# Patient Record
Sex: FEMALE | NOT HISPANIC OR LATINO
[De-identification: names, ages, dates, MRNs, and addresses within clinical notes are randomized per-mention and may not be internally consistent; named-entity substitution may affect disease eponyms.]

---

## 2023-11-30 ENCOUNTER — APPOINTMENT (OUTPATIENT)
Dept: ORTHOPEDIC SURGERY | Facility: CLINIC | Age: 68
End: 2023-11-30
Payer: MEDICARE

## 2023-11-30 VITALS — HEIGHT: 62 IN | BODY MASS INDEX: 29.63 KG/M2 | WEIGHT: 161 LBS

## 2023-11-30 DIAGNOSIS — M51.36 OTHER INTERVERTEBRAL DISC DEGENERATION, LUMBAR REGION: ICD-10-CM

## 2023-11-30 DIAGNOSIS — E78.00 PURE HYPERCHOLESTEROLEMIA, UNSPECIFIED: ICD-10-CM

## 2023-11-30 PROBLEM — Z00.00 ENCOUNTER FOR PREVENTIVE HEALTH EXAMINATION: Status: ACTIVE | Noted: 2023-11-30

## 2023-11-30 PROCEDURE — 72100 X-RAY EXAM L-S SPINE 2/3 VWS: CPT

## 2023-11-30 PROCEDURE — 99204 OFFICE O/P NEW MOD 45 MIN: CPT

## 2023-11-30 PROCEDURE — 72170 X-RAY EXAM OF PELVIS: CPT

## 2023-11-30 RX ORDER — ATORVASTATIN CALCIUM 80 MG/1
TABLET, FILM COATED ORAL
Refills: 0 | Status: ACTIVE | COMMUNITY

## 2023-11-30 RX ORDER — LEVOTHYROXINE SODIUM 137 UG/1
TABLET ORAL
Refills: 0 | Status: ACTIVE | COMMUNITY

## 2023-11-30 RX ORDER — CYCLOBENZAPRINE HYDROCHLORIDE 5 MG/1
5 TABLET, FILM COATED ORAL
Qty: 30 | Refills: 0 | Status: ACTIVE | COMMUNITY
Start: 2023-11-30 | End: 1900-01-01

## 2023-11-30 RX ORDER — METHYLPREDNISOLONE 4 MG/1
4 TABLET ORAL
Qty: 1 | Refills: 0 | Status: ACTIVE | COMMUNITY
Start: 2023-11-30 | End: 1900-01-01

## 2023-12-08 ENCOUNTER — APPOINTMENT (OUTPATIENT)
Dept: MRI IMAGING | Facility: CLINIC | Age: 68
End: 2023-12-08

## 2023-12-11 ENCOUNTER — APPOINTMENT (OUTPATIENT)
Dept: MRI IMAGING | Facility: CLINIC | Age: 68
End: 2023-12-11
Payer: MEDICARE

## 2023-12-11 PROCEDURE — 72158 MRI LUMBAR SPINE W/O & W/DYE: CPT

## 2023-12-22 ENCOUNTER — APPOINTMENT (OUTPATIENT)
Dept: ORTHOPEDIC SURGERY | Facility: CLINIC | Age: 68
End: 2023-12-22

## 2024-09-05 ENCOUNTER — APPOINTMENT (OUTPATIENT)
Dept: PODIATRY | Facility: CLINIC | Age: 69
End: 2024-09-05
Payer: MEDICARE

## 2024-09-05 DIAGNOSIS — Z80.0 FAMILY HISTORY OF MALIGNANT NEOPLASM OF DIGESTIVE ORGANS: ICD-10-CM

## 2024-09-05 DIAGNOSIS — Z83.438 FAMILY HISTORY OF OTHER DISORDER OF LIPOPROTEIN METABOLISM AND OTHER LIPIDEMIA: ICD-10-CM

## 2024-09-05 DIAGNOSIS — E78.5 HYPERLIPIDEMIA, UNSPECIFIED: ICD-10-CM

## 2024-09-05 DIAGNOSIS — Z78.9 OTHER SPECIFIED HEALTH STATUS: ICD-10-CM

## 2024-09-05 DIAGNOSIS — Z87.891 PERSONAL HISTORY OF NICOTINE DEPENDENCE: ICD-10-CM

## 2024-09-05 DIAGNOSIS — Z86.39 PERSONAL HISTORY OF OTHER ENDOCRINE, NUTRITIONAL AND METABOLIC DISEASE: ICD-10-CM

## 2024-09-05 DIAGNOSIS — Z80.1 FAMILY HISTORY OF MALIGNANT NEOPLASM OF TRACHEA, BRONCHUS AND LUNG: ICD-10-CM

## 2024-09-05 DIAGNOSIS — Z80.49 FAMILY HISTORY OF MALIGNANT NEOPLASM OF OTHER GENITAL ORGANS: ICD-10-CM

## 2024-09-05 DIAGNOSIS — B07.0 PLANTAR WART: ICD-10-CM

## 2024-09-05 DIAGNOSIS — M79.672 PAIN IN LEFT FOOT: ICD-10-CM

## 2024-09-05 DIAGNOSIS — E03.9 HYPOTHYROIDISM, UNSPECIFIED: ICD-10-CM

## 2024-09-05 DIAGNOSIS — Z81.8 FAMILY HISTORY OF OTHER MENTAL AND BEHAVIORAL DISORDERS: ICD-10-CM

## 2024-09-05 DIAGNOSIS — Z84.0 FAMILY HISTORY OF DISEASES OF THE SKIN AND SUBCUTANEOUS TISSUE: ICD-10-CM

## 2024-09-05 DIAGNOSIS — Z82.49 FAMILY HISTORY OF ISCHEMIC HEART DISEASE AND OTHER DISEASES OF THE CIRCULATORY SYSTEM: ICD-10-CM

## 2024-09-05 DIAGNOSIS — Z82.3 FAMILY HISTORY OF STROKE: ICD-10-CM

## 2024-09-05 DIAGNOSIS — Z81.3 FAMILY HISTORY OF OTHER PSYCHOACTIVE SUBSTANCE ABUSE AND DEPENDENCE: ICD-10-CM

## 2024-09-05 PROCEDURE — 99203 OFFICE O/P NEW LOW 30 MIN: CPT | Mod: 25

## 2024-09-05 PROCEDURE — 17110 DESTRUCTION B9 LES UP TO 14: CPT | Mod: LT

## 2024-09-05 RX ORDER — ZINC SULFATE 50(220)MG
CAPSULE ORAL
Refills: 0 | Status: ACTIVE | COMMUNITY

## 2024-09-05 RX ORDER — GINGER ROOT/GINGER ROOT EXT 262.5 MG
CAPSULE ORAL
Refills: 0 | Status: ACTIVE | COMMUNITY

## 2024-09-05 RX ORDER — PSYLLIUM HUSK 0.4 G
CAPSULE ORAL
Refills: 0 | Status: ACTIVE | COMMUNITY

## 2024-09-05 RX ORDER — ATORVASTATIN CALCIUM 80 MG/1
TABLET, FILM COATED ORAL
Refills: 0 | Status: ACTIVE | COMMUNITY

## 2024-09-05 RX ORDER — PNV NO.95/FERROUS FUM/FOLIC AC 28MG-0.8MG
TABLET ORAL
Refills: 0 | Status: ACTIVE | COMMUNITY

## 2024-09-05 RX ORDER — CHROMIUM 200 MCG
TABLET ORAL
Refills: 0 | Status: ACTIVE | COMMUNITY

## 2024-09-05 NOTE — PROCEDURE
[FreeTextEntry1] : Plan:  Examination.  (Bi=[id number]).  We had a lengthy discussion concerning the patient's condition. Surgical debridement of the verrucous lesion to pinpoint bleeding.   AGNO3 of less than 14 (Bc=[id number]) was applied, followed by a dry, sterile dressing.  Patient was advised that removing a wart with this method requires debriding the area, applying the AGNO3, and removing the dead skin.  Advised patient that it may take up to 12 weeks to remove a wart.  We ordered and instructed the patient to apply EBM W1 to the wart twice daily. Patient to return:  2weeks.

## 2024-09-05 NOTE — HISTORY OF PRESENT ILLNESS
[FreeTextEntry1] : Delphine is a 69-year-old female who presents upon referral from her friend Mrs. Temple complaining of a growth on her left foot.  It has been there for years.  It only recently started hurting her.  she has done no self treatment.

## 2024-09-05 NOTE — PHYSICAL EXAM
[FreeTextEntry3] : Vascular Exam: DP Pulse (left): 1/4. DP Pulse (right): 1/4. PT Pulse (left): 1/4. PT Pulse (right): 1/4. Capillary Return - L: Instantaneous. Capillary Return - R: Instantaneous. Temperature Grad - L: Hot to Warm. Temperature Grad - R: Hot to Warm. [de-identified] : Orthopedic Exam: No structural deformities present. [FreeTextEntry1] : Neurological Exam: Sharp / Dull - L: WNL. Sharp / Dull - R: WNL. Light Touch/pressure - L: WNL. Light Touch/pressure - R: WNL. Hot/cold - L: WNL. Hot/cold - R: WNL. Vibratory - L: WNL. Vibratory - R: WNL.

## 2024-09-06 PROBLEM — Z80.1 FAMILY HISTORY OF LUNG CANCER: Status: ACTIVE | Noted: 2024-09-05

## 2024-09-06 PROBLEM — Z82.49 FAMILY HISTORY OF HYPERTENSION: Status: ACTIVE | Noted: 2024-09-05

## 2024-09-06 PROBLEM — Z80.49 FAMILY HISTORY OF CERVICAL CANCER: Status: ACTIVE | Noted: 2024-09-05

## 2024-09-06 PROBLEM — Z86.39 HISTORY OF HYPOTHYROIDISM: Status: RESOLVED | Noted: 2024-09-05 | Resolved: 2024-09-06

## 2024-09-06 PROBLEM — Z80.0 FAMILY HISTORY OF MALIGNANT NEOPLASM OF COLON: Status: ACTIVE | Noted: 2024-09-05

## 2024-09-06 PROBLEM — Z87.891 FORMER SMOKER: Status: ACTIVE | Noted: 2024-09-05

## 2024-09-06 PROBLEM — Z84.0 FAMILY HISTORY OF PSORIASIS: Status: ACTIVE | Noted: 2024-09-05

## 2024-09-06 PROBLEM — Z81.8 FAMILY HISTORY OF BIPOLAR DISORDER: Status: ACTIVE | Noted: 2024-09-05

## 2024-09-06 PROBLEM — E78.5 HYPERLIPIDEMIA: Status: ACTIVE | Noted: 2024-09-05

## 2024-09-06 PROBLEM — Z83.438 FAMILY HISTORY OF HYPERLIPIDEMIA: Status: ACTIVE | Noted: 2024-09-05

## 2024-09-06 PROBLEM — Z81.3 FAMILY HISTORY OF DRUG ABUSE: Status: ACTIVE | Noted: 2024-09-05

## 2024-09-06 PROBLEM — Z82.49 FAMILY HISTORY OF CARDIAC DISORDER: Status: ACTIVE | Noted: 2024-09-05

## 2024-09-06 PROBLEM — Z78.9 SOCIAL ALCOHOL USE: Status: ACTIVE | Noted: 2024-09-05

## 2024-09-06 PROBLEM — E03.9 HYPOTHYROIDISM: Status: ACTIVE | Noted: 2024-09-05

## 2024-09-06 PROBLEM — Z82.3 FAMILY HISTORY OF CEREBROVASCULAR ACCIDENT (CVA): Status: ACTIVE | Noted: 2024-09-05

## 2024-09-19 ENCOUNTER — APPOINTMENT (OUTPATIENT)
Dept: PODIATRY | Facility: CLINIC | Age: 69
End: 2024-09-19
Payer: MEDICARE

## 2024-09-19 DIAGNOSIS — B07.0 PLANTAR WART: ICD-10-CM

## 2024-09-19 PROCEDURE — 17110 DESTRUCTION B9 LES UP TO 14: CPT

## 2024-09-19 NOTE — PHYSICAL EXAM
[FreeTextEntry1] : Dermatological Exam: Patient presents with 3 verrucous lesions on the plantar aspect of the left foot.  After debridement the 2 lesions by the styloid process have resolved.  The sub-metatarsal 2 lesion is now pinpoint.proximal to the 2nd metatarsal head left foot.  Upon palpation patient felt tenderness upon pinch test rather than direct palpation. The lesion resembles a cauliflower like appearance, with petechiae.  No signs of bacterial infection at this time.

## 2024-09-19 NOTE — PROCEDURE
[FreeTextEntry1] : Plan:  Exam. Surgical debridement of the verrucous lesion to pinpoint bleeding.   AGNO3 of less than 14 (Ex=[id number]) was applied, followed by a dry, sterile dressing.  Patient was advised that removing a wart with this method requires debriding the area, applying the AGNO3, and removing the dead skin.  Advised patient that it may take up to 12 weeks to remove a wart.  We instructed the patient to apply EBM W1 to the remaining wart twice daily. Patient to return:  2weeks.

## 2024-10-03 ENCOUNTER — APPOINTMENT (OUTPATIENT)
Dept: PODIATRY | Facility: CLINIC | Age: 69
End: 2024-10-03
Payer: MEDICARE

## 2024-10-03 DIAGNOSIS — B07.0 PLANTAR WART: ICD-10-CM

## 2024-10-03 PROCEDURE — 17110 DESTRUCTION B9 LES UP TO 14: CPT

## 2024-10-03 NOTE — HISTORY OF PRESENT ILLNESS
[FreeTextEntry1] : Delphine is a 69-year-old female who presents to our office this morning for continued care of a wart on her left foot.  She has been using the medication as directed.  Having little pain at this time.

## 2024-10-03 NOTE — PHYSICAL EXAM
[FreeTextEntry1] : Dermatological Exam: Patient presents with a verrucous lesion on the plantar aspect of the left foot, sub-metatarsal 2.  It is pinpoint.in diameter.  Upon palpation patient felt tenderness upon pinch test rather than direct palpation. The lesion resembles a cauliflower like appearance, with petechiae.  No signs of bacterial infection at this time.

## 2024-10-03 NOTE — PROCEDURE
[FreeTextEntry1] : Plan:  Exam. Surgical debridement of the verrucous lesion to pinpoint bleeding.   AGNO3 of less than 14 (Mq=[id number]) was applied, followed by a dry, sterile dressing.  Patient was advised that removing a wart with this method requires debriding the area, applying the AGNO3, and removing the dead skin.  Advised patient that it may take up to 12 weeks to remove a wart.  We instructed the patient to apply EBM W1 to the remaining wart twice daily. Patient to return:  2weeks.

## 2024-10-17 ENCOUNTER — APPOINTMENT (OUTPATIENT)
Dept: PODIATRY | Facility: CLINIC | Age: 69
End: 2024-10-17
Payer: MEDICARE

## 2024-10-17 DIAGNOSIS — B07.0 PLANTAR WART: ICD-10-CM

## 2024-10-17 PROCEDURE — 17110 DESTRUCTION B9 LES UP TO 14: CPT

## 2024-10-29 ENCOUNTER — APPOINTMENT (OUTPATIENT)
Dept: PODIATRY | Facility: CLINIC | Age: 69
End: 2024-10-29
Payer: MEDICARE

## 2024-10-29 DIAGNOSIS — B07.0 PLANTAR WART: ICD-10-CM

## 2024-10-29 PROCEDURE — 17110 DESTRUCTION B9 LES UP TO 14: CPT

## 2024-11-12 ENCOUNTER — APPOINTMENT (OUTPATIENT)
Dept: PODIATRY | Facility: CLINIC | Age: 69
End: 2024-11-12
Payer: MEDICARE

## 2024-11-12 DIAGNOSIS — B07.0 PLANTAR WART: ICD-10-CM

## 2024-11-12 PROCEDURE — 17110 DESTRUCTION B9 LES UP TO 14: CPT

## 2024-11-26 ENCOUNTER — APPOINTMENT (OUTPATIENT)
Dept: PODIATRY | Facility: CLINIC | Age: 69
End: 2024-11-26
Payer: MEDICARE

## 2024-11-26 ENCOUNTER — NON-APPOINTMENT (OUTPATIENT)
Age: 69
End: 2024-11-26

## 2024-11-26 DIAGNOSIS — B07.0 PLANTAR WART: ICD-10-CM

## 2024-11-26 PROCEDURE — 17110 DESTRUCTION B9 LES UP TO 14: CPT

## 2024-12-10 ENCOUNTER — APPOINTMENT (OUTPATIENT)
Dept: PODIATRY | Facility: CLINIC | Age: 69
End: 2024-12-10
Payer: MEDICARE

## 2024-12-10 DIAGNOSIS — B07.0 PLANTAR WART: ICD-10-CM

## 2024-12-10 PROCEDURE — 17110 DESTRUCTION B9 LES UP TO 14: CPT

## 2024-12-26 ENCOUNTER — APPOINTMENT (OUTPATIENT)
Dept: PODIATRY | Facility: CLINIC | Age: 69
End: 2024-12-26
Payer: MEDICARE

## 2024-12-26 DIAGNOSIS — B07.0 PLANTAR WART: ICD-10-CM

## 2024-12-26 PROCEDURE — 17110 DESTRUCTION B9 LES UP TO 14: CPT

## 2025-01-09 ENCOUNTER — APPOINTMENT (OUTPATIENT)
Dept: PODIATRY | Facility: CLINIC | Age: 70
End: 2025-01-09
Payer: MEDICARE

## 2025-01-09 DIAGNOSIS — B07.0 PLANTAR WART: ICD-10-CM

## 2025-01-09 PROCEDURE — 99212 OFFICE O/P EST SF 10 MIN: CPT

## 2025-02-26 ENCOUNTER — APPOINTMENT (OUTPATIENT)
Dept: PODIATRY | Facility: CLINIC | Age: 70
End: 2025-02-26
Payer: MEDICARE

## 2025-02-26 ENCOUNTER — NON-APPOINTMENT (OUTPATIENT)
Age: 70
End: 2025-02-26

## 2025-02-26 DIAGNOSIS — B07.0 PLANTAR WART: ICD-10-CM

## 2025-02-26 PROCEDURE — 17110 DESTRUCTION B9 LES UP TO 14: CPT

## 2025-03-12 ENCOUNTER — APPOINTMENT (OUTPATIENT)
Dept: PODIATRY | Facility: CLINIC | Age: 70
End: 2025-03-12
Payer: MEDICARE

## 2025-03-12 DIAGNOSIS — B07.0 PLANTAR WART: ICD-10-CM

## 2025-03-12 PROCEDURE — 99212 OFFICE O/P EST SF 10 MIN: CPT
